# Patient Record
Sex: FEMALE | Race: WHITE | Employment: FULL TIME | URBAN - METROPOLITAN AREA
[De-identification: names, ages, dates, MRNs, and addresses within clinical notes are randomized per-mention and may not be internally consistent; named-entity substitution may affect disease eponyms.]

---

## 2018-09-07 ENCOUNTER — HOSPITAL ENCOUNTER (OUTPATIENT)
Dept: GENERAL RADIOLOGY | Age: 58
Discharge: HOME | End: 2018-09-07
Attending: ORTHOPAEDIC SURGERY
Payer: COMMERCIAL

## 2018-09-07 ENCOUNTER — HOSPITAL ENCOUNTER (OUTPATIENT)
Dept: MRI IMAGING | Age: 58
Discharge: HOME | End: 2018-09-07
Attending: ORTHOPAEDIC SURGERY
Payer: COMMERCIAL

## 2018-09-07 DIAGNOSIS — M25.562 LEFT KNEE PAIN: ICD-10-CM

## 2018-09-07 PROCEDURE — 73721 MRI JNT OF LWR EXTRE W/O DYE: CPT

## 2018-09-07 PROCEDURE — 73501 X-RAY EXAM HIP UNI 1 VIEW: CPT

## 2018-09-07 PROCEDURE — 73560 X-RAY EXAM OF KNEE 1 OR 2: CPT

## 2018-09-07 PROCEDURE — 73600 X-RAY EXAM OF ANKLE: CPT

## 2018-10-25 NOTE — ROUTINE PROCESS
Aarti Marie attended the Joint Replacement Pre-Operative class on  10.25.2018. Topics discussed included surgery preparation, what to expect the day of surgery, medications, nutrition, glycemic control, respiratory therapy, physical and occupational therapy, and discharge planning. A patient education notebook was provided and the opportunity was given to ask questions and phone number of the Orthopaedic  was given for any future questions or concerns. Oscar Browne

## 2018-11-13 ENCOUNTER — HOSPITAL ENCOUNTER (OUTPATIENT)
Dept: PREADMISSION TESTING | Age: 58
Discharge: HOME | End: 2018-11-13
Payer: COMMERCIAL

## 2018-11-13 VITALS — HEIGHT: 71 IN | WEIGHT: 281 LBS | BODY MASS INDEX: 39.34 KG/M2

## 2018-11-13 LAB
ALBUMIN SERPL-MCNC: 3.7 G/DL (ref 3.4–5)
ALBUMIN/GLOB SERPL: 1.2 {RATIO} (ref 0.8–1.7)
ALP SERPL-CCNC: 81 U/L (ref 45–117)
ALT SERPL-CCNC: 37 U/L (ref 13–56)
ANION GAP SERPL CALC-SCNC: 10 MMOL/L (ref 3–18)
APPEARANCE UR: CLEAR
AST SERPL-CCNC: 26 U/L (ref 15–37)
ATRIAL RATE: 80 BPM
BACTERIA SPEC CULT: NORMAL
BACTERIA URNS QL MICRO: ABNORMAL /HPF
BILIRUB SERPL-MCNC: 1 MG/DL (ref 0.2–1)
BILIRUB UR QL: NEGATIVE
BUN SERPL-MCNC: 23 MG/DL (ref 7–18)
BUN/CREAT SERPL: 28
CALCIUM SERPL-MCNC: 8.5 MG/DL (ref 8.5–10.1)
CALCULATED P AXIS, ECG09: 54 DEGREES
CALCULATED R AXIS, ECG10: 52 DEGREES
CALCULATED T AXIS, ECG11: 68 DEGREES
CHLORIDE SERPL-SCNC: 105 MMOL/L (ref 100–108)
CO2 SERPL-SCNC: 25 MMOL/L (ref 21–32)
COLOR UR: YELLOW
CREAT SERPL-MCNC: 0.81 MG/DL (ref 0.6–1.3)
DIAGNOSIS, 93000: NORMAL
EPITH CASTS URNS QL MICRO: ABNORMAL /LPF (ref 0–5)
ERYTHROCYTE [DISTWIDTH] IN BLOOD BY AUTOMATED COUNT: 13.3 % (ref 11.6–14.5)
GLOBULIN SER CALC-MCNC: 3.1 G/DL (ref 2–4)
GLUCOSE SERPL-MCNC: 99 MG/DL (ref 74–99)
GLUCOSE UR STRIP.AUTO-MCNC: NEGATIVE MG/DL
HCT VFR BLD AUTO: 43.1 % (ref 35–45)
HGB BLD-MCNC: 14.1 G/DL (ref 12–16)
HGB UR QL STRIP: NEGATIVE
KETONES UR QL STRIP.AUTO: NEGATIVE MG/DL
LEUKOCYTE ESTERASE UR QL STRIP.AUTO: ABNORMAL
MCH RBC QN AUTO: 29.9 PG (ref 24–34)
MCHC RBC AUTO-ENTMCNC: 32.7 G/DL (ref 31–37)
MCV RBC AUTO: 91.3 FL (ref 74–97)
MUCOUS THREADS URNS QL MICRO: ABNORMAL /LPF
NITRITE UR QL STRIP.AUTO: NEGATIVE
P-R INTERVAL, ECG05: 158 MS
PH UR STRIP: 6 [PH] (ref 5–8)
PLATELET # BLD AUTO: 234 K/UL (ref 135–420)
PMV BLD AUTO: 10.3 FL (ref 9.2–11.8)
POTASSIUM SERPL-SCNC: 4.2 MMOL/L (ref 3.5–5.5)
PROT SERPL-MCNC: 6.8 G/DL (ref 6.4–8.2)
PROT UR STRIP-MCNC: NEGATIVE MG/DL
Q-T INTERVAL, ECG07: 400 MS
QRS DURATION, ECG06: 148 MS
QTC CALCULATION (BEZET), ECG08: 461 MS
RBC # BLD AUTO: 4.72 M/UL (ref 4.2–5.3)
RBC #/AREA URNS HPF: ABNORMAL /HPF (ref 0–5)
SERVICE CMNT-IMP: NORMAL
SODIUM SERPL-SCNC: 140 MMOL/L (ref 136–145)
SP GR UR REFRACTOMETRY: 1.02 (ref 1–1.03)
UROBILINOGEN UR QL STRIP.AUTO: 1 EU/DL (ref 0.2–1)
VENTRICULAR RATE, ECG03: 80 BPM
WBC # BLD AUTO: 4.9 K/UL (ref 4.6–13.2)
WBC URNS QL MICRO: ABNORMAL /HPF (ref 0–5)

## 2018-11-13 PROCEDURE — 81001 URINALYSIS AUTO W/SCOPE: CPT

## 2018-11-13 PROCEDURE — 80053 COMPREHEN METABOLIC PANEL: CPT

## 2018-11-13 PROCEDURE — 85027 COMPLETE CBC AUTOMATED: CPT

## 2018-11-13 PROCEDURE — 87086 URINE CULTURE/COLONY COUNT: CPT

## 2018-11-13 PROCEDURE — 87641 MR-STAPH DNA AMP PROBE: CPT

## 2018-11-13 PROCEDURE — 93005 ELECTROCARDIOGRAM TRACING: CPT

## 2018-11-13 RX ORDER — ERGOCALCIFEROL 1.25 MG/1
50000 CAPSULE ORAL
COMMUNITY

## 2018-11-13 RX ORDER — MELOXICAM 15 MG/1
15 TABLET ORAL DAILY
COMMUNITY
End: 2018-11-28

## 2018-11-13 RX ORDER — DESLORATADINE 5 MG/1
5 TABLET ORAL DAILY
COMMUNITY

## 2018-11-13 RX ORDER — CEFAZOLIN SODIUM/WATER 2 G/20 ML
2 SYRINGE (ML) INTRAVENOUS ONCE
Status: DISCONTINUED | OUTPATIENT
Start: 2018-11-13 | End: 2018-11-13

## 2018-11-13 RX ORDER — LEVOTHYROXINE SODIUM 137 UG/1
137 TABLET ORAL
COMMUNITY

## 2018-11-13 RX ORDER — LOSARTAN POTASSIUM 100 MG/1
100 TABLET ORAL DAILY
COMMUNITY

## 2018-11-13 RX ORDER — SODIUM CHLORIDE, SODIUM LACTATE, POTASSIUM CHLORIDE, CALCIUM CHLORIDE 600; 310; 30; 20 MG/100ML; MG/100ML; MG/100ML; MG/100ML
125 INJECTION, SOLUTION INTRAVENOUS CONTINUOUS
Status: CANCELLED | OUTPATIENT
Start: 2018-11-13

## 2018-11-13 RX ORDER — PANTOPRAZOLE SODIUM 40 MG/1
40 TABLET, DELAYED RELEASE ORAL DAILY
COMMUNITY

## 2018-11-15 LAB
BACTERIA SPEC CULT: NORMAL
SERVICE CMNT-IMP: NORMAL

## 2018-11-26 PROBLEM — I10 HTN (HYPERTENSION): Chronic | Status: ACTIVE | Noted: 2018-11-26

## 2018-11-26 PROBLEM — E03.9 ACQUIRED HYPOTHYROIDISM: Chronic | Status: ACTIVE | Noted: 2018-11-26

## 2018-11-26 PROBLEM — M17.12 PRIMARY OSTEOARTHRITIS OF LEFT KNEE: Chronic | Status: ACTIVE | Noted: 2018-11-26

## 2018-11-26 NOTE — H&P
History and Physical 
 
 
 
Patient: Lieutenant Kapoor               Sex: female          DOA: (Not on file) YOB: 1960      Age:  62 y.o.        LOS:  LOS: 0 days HPI:  
 
Lucien Cutler is a 35-year-old right-handed white female referred here today for evaluation and treatment of her left knee severe medial tibiofemoral/moderate patellofemoral DJD. The patient has had progressive knee pain over the years and it has not gotten better with relative rest.  She has already been through Mobic, over-the-counter NSAIDs, cortisone injections, viscosupplementation, and physical therapy; all of which have not made her any better. She has had the progressive pain over 10 years and is now ready to progress to knee replacement. Her primary care is Dr. Rachna Claudio. Standing AP, tunnel, lateral, and sunrise views of the left knee were obtained and interpreted in the office and reveal bone-on-bone medial tibiofemoral and moderate patellofemoral DJD. Otherwise, no periosteal reaction, no medullary lesions, no osteopenia, and no chondrolysis. Past Medical History:  
Diagnosis Date  Arthritis   
 all over  Autoimmune disease ([address] Lichen Planus disease on gums  GERD (gastroesophageal reflux disease) 2016  
 well controlled  Hypertension 2016  Thyroid disease many years Past Surgical History:  
Procedure Laterality Date  HX  SECTION    
 x 2  
 HX HEENT  1998  
 thyroid removed  HX OTHER SURGICAL  as a child  
 mole removed from ankle  HX OTHER SURGICAL    
 fatty tumor removed from back No family history on file. Social History Socioeconomic History  Marital status:  Spouse name: Not on file  Number of children: Not on file  Years of education: Not on file  Highest education level: Not on file Tobacco Use  Smoking status: Never Smoker  Smokeless tobacco: Never Used Substance and Sexual Activity  Alcohol use: Yes Alcohol/week: 2.4 oz Types: 4 Glasses of wine per week Comment: advised no etoh 24 h pre  Drug use: No  
 
 
Prior to Admission medications Medication Sig Start Date End Date Taking? Authorizing Provider  
losartan (COZAAR) 100 mg tablet Take 100 mg by mouth daily. Provider, Historical  
desloratadine (CLARINEX) 5 mg tablet Take 5 mg by mouth daily. Provider, Historical  
levothyroxine (SYNTHROID) 137 mcg tablet Take 137 mcg by mouth Daily (before breakfast). Provider, Historical  
pantoprazole (PROTONIX) 40 mg tablet Take 40 mg by mouth daily. Provider, Historical  
meloxicam (MOBIC) 15 mg tablet Take 15 mg by mouth daily. Provider, Historical  
ergocalciferol (VITAMIN D2) 50,000 unit capsule Take 50,000 Units by mouth two (2) days a week. Provider, Historical  
 
 
Not on File Review of Systems Pertinent positives include joint pain, joint stiffness, numbness/tingling and swelling of feet. Pertinent negatives include blurred vision, chest pain, chills, cold, discharge of the eyes, dizziness, double vision, fever, headache, hearing loss, heart murmur, itching of the eyes, palpitations, redness of the eyes, rheumatic fever, ringing in ears, sore throat/hoarseness, weight change, abdominal pain, anxiety, bipolar disorder, bladder/kidney infection, bloody stool, blood in urine, burning sensation, changes in mood, chronic cough, depression, diarrhea, difficulty breathing, difficulty swallowing, fainting, frequent urinating, fracture/dislocation, gas/bloating, gout, hemorrhoids, incontinence, loss of balance, memory loss, muscle weakness, nausea/vomiting, pain on breathing, painful urination, psoriasis, rash/itching, Raynaud's phenomenon, rheumatoid disease, seizure disorder, shortness of breath, sprain/strain, tendonitis, varicose veins and wheezing. Physical Exam:  
  
There were no vitals taken for this visit. Physical Exam: Physical exam shows a healthy-appearing 80-year-old  female. The left knee has full extension to flexion to about 120 degrees. She has a negative Lachman and pain on the medial joint line, more so than the lateral joint line and positive patellofemoral crepitation with positive patellar inhibition. Otherwise, examination of the knee demonstrates the patient has no varus or valgus instability at zero degrees or 30 degrees. There is a negative posterior sag. There is no knee effusion. There is no swelling, ecchymosis, or wounds. The patient has no lateral joint line pain and no popliteal pain. The patient has a negative patellar grind and a negative patellar apprehension test.  There is a negative Kali Maneuver. There is no pain at the inferior pole of the patella or the tibial tubercle. The patient has 5/5 muscle strength with good quadriceps tone. The patient has good capillary refill with normal motor strength of the foot and ankle and normal light-touch sensation in the foot and lower leg. Assessment/Plan Principal Problem: 
  Primary osteoarthritis of left knee (11/26/2018) Active Problems: 
  HTN (hypertension) (11/26/2018) Acquired hypothyroidism (11/26/2018) Dr. Timmy Flores thinks she is a good candidate for the Journey-2 XR with Visionaire blocks. The patient understands the risks, the alternatives, and the benefits including infection, pain, bleeding, and DVT. She is willing to proceed. We will plan to do this on an outpatient basis. Dr. Timmy Flores issued Roxicodone and Keflex to her pharmacy today, and she will take baby aspirin for postoperative DVT prophylaxis. We will arrange her on my outpatient physical therapy protocol. Dr. Timmy Flores will see her again two weeks postop. We are going to do this in November of 2018.

## 2018-11-27 RX ORDER — SODIUM CHLORIDE, SODIUM LACTATE, POTASSIUM CHLORIDE, CALCIUM CHLORIDE 600; 310; 30; 20 MG/100ML; MG/100ML; MG/100ML; MG/100ML
125 INJECTION, SOLUTION INTRAVENOUS CONTINUOUS
Status: CANCELLED | OUTPATIENT
Start: 2018-11-27 | End: 2018-11-28

## 2018-11-27 RX ORDER — DIPHENHYDRAMINE HCL 25 MG
25 CAPSULE ORAL
Status: CANCELLED | OUTPATIENT
Start: 2018-11-27

## 2018-11-27 RX ORDER — SODIUM CHLORIDE 0.9 % (FLUSH) 0.9 %
5-10 SYRINGE (ML) INJECTION EVERY 8 HOURS
Status: CANCELLED | OUTPATIENT
Start: 2018-11-27

## 2018-11-27 RX ORDER — SODIUM CHLORIDE 0.9 % (FLUSH) 0.9 %
5-10 SYRINGE (ML) INJECTION AS NEEDED
Status: CANCELLED | OUTPATIENT
Start: 2018-11-27

## 2018-11-28 ENCOUNTER — ANESTHESIA (OUTPATIENT)
Dept: SURGERY | Age: 58
End: 2018-11-28
Payer: COMMERCIAL

## 2018-11-28 ENCOUNTER — ANESTHESIA EVENT (OUTPATIENT)
Dept: SURGERY | Age: 58
End: 2018-11-28
Payer: COMMERCIAL

## 2018-11-28 ENCOUNTER — HOSPITAL ENCOUNTER (OUTPATIENT)
Age: 58
Discharge: HOME | End: 2018-11-28
Attending: ORTHOPAEDIC SURGERY | Admitting: ORTHOPAEDIC SURGERY
Payer: COMMERCIAL

## 2018-11-28 VITALS
HEIGHT: 71 IN | SYSTOLIC BLOOD PRESSURE: 126 MMHG | WEIGHT: 278.6 LBS | TEMPERATURE: 98.1 F | DIASTOLIC BLOOD PRESSURE: 87 MMHG | BODY MASS INDEX: 39 KG/M2 | OXYGEN SATURATION: 91 % | RESPIRATION RATE: 18 BRPM | HEART RATE: 94 BPM

## 2018-11-28 DIAGNOSIS — M17.12 PRIMARY OSTEOARTHRITIS OF LEFT KNEE: Primary | Chronic | ICD-10-CM

## 2018-11-28 LAB
ABO + RH BLD: NORMAL
BLOOD GROUP ANTIBODIES SERPL: NORMAL
SPECIMEN EXP DATE BLD: NORMAL

## 2018-11-28 PROCEDURE — 77030016060 HC NDL NRV BLK TELE -A: Performed by: ANESTHESIOLOGY

## 2018-11-28 PROCEDURE — 77030018836 HC SOL IRR NACL ICUM -A: Performed by: ORTHOPAEDIC SURGERY

## 2018-11-28 PROCEDURE — 77030002934 HC SUT MCRYL J&J -B: Performed by: ORTHOPAEDIC SURGERY

## 2018-11-28 PROCEDURE — 36415 COLL VENOUS BLD VENIPUNCTURE: CPT

## 2018-11-28 PROCEDURE — 77030012508 HC MSK AIRWY LMA AMBU -A: Performed by: ANESTHESIOLOGY

## 2018-11-28 PROCEDURE — 74011250636 HC RX REV CODE- 250/636

## 2018-11-28 PROCEDURE — 74011250636 HC RX REV CODE- 250/636: Performed by: PHYSICIAN ASSISTANT

## 2018-11-28 PROCEDURE — 74011250636 HC RX REV CODE- 250/636: Performed by: ORTHOPAEDIC SURGERY

## 2018-11-28 PROCEDURE — 86901 BLOOD TYPING SEROLOGIC RH(D): CPT

## 2018-11-28 PROCEDURE — 77030032490 HC SLV COMPR SCD KNE COVD -B: Performed by: ORTHOPAEDIC SURGERY

## 2018-11-28 PROCEDURE — C1713 ANCHOR/SCREW BN/BN,TIS/BN: HCPCS | Performed by: ORTHOPAEDIC SURGERY

## 2018-11-28 PROCEDURE — 76060000035 HC ANESTHESIA 2 TO 2.5 HR: Performed by: ORTHOPAEDIC SURGERY

## 2018-11-28 PROCEDURE — 64447 NJX AA&/STRD FEMORAL NRV IMG: CPT | Performed by: ORTHOPAEDIC SURGERY

## 2018-11-28 PROCEDURE — 77030037875 HC DRSG MEPILEX <16IN BORD MOLN -A: Performed by: ORTHOPAEDIC SURGERY

## 2018-11-28 PROCEDURE — 77030031139 HC SUT VCRL2 J&J -A: Performed by: ORTHOPAEDIC SURGERY

## 2018-11-28 PROCEDURE — 76942 ECHO GUIDE FOR BIOPSY: CPT | Performed by: ORTHOPAEDIC SURGERY

## 2018-11-28 PROCEDURE — 74011250637 HC RX REV CODE- 250/637: Performed by: ANESTHESIOLOGY

## 2018-11-28 PROCEDURE — 74011000258 HC RX REV CODE- 258: Performed by: ORTHOPAEDIC SURGERY

## 2018-11-28 PROCEDURE — 77030003666 HC NDL SPINAL BD -A: Performed by: ORTHOPAEDIC SURGERY

## 2018-11-28 PROCEDURE — L1830 KO IMMOB CANVAS LONG PRE OTS: HCPCS | Performed by: ORTHOPAEDIC SURGERY

## 2018-11-28 PROCEDURE — 74011250636 HC RX REV CODE- 250/636: Performed by: ANESTHESIOLOGY

## 2018-11-28 PROCEDURE — 77030038010: Performed by: ORTHOPAEDIC SURGERY

## 2018-11-28 PROCEDURE — 74011250637 HC RX REV CODE- 250/637: Performed by: PHYSICIAN ASSISTANT

## 2018-11-28 PROCEDURE — 74011000250 HC RX REV CODE- 250

## 2018-11-28 PROCEDURE — 76010000131 HC OR TIME 2 TO 2.5 HR: Performed by: ORTHOPAEDIC SURGERY

## 2018-11-28 PROCEDURE — 97116 GAIT TRAINING THERAPY: CPT

## 2018-11-28 PROCEDURE — 77030020268 HC MISC GENERAL SUPPLY: Performed by: ORTHOPAEDIC SURGERY

## 2018-11-28 PROCEDURE — 77030039267 HC ADH SKN EXOFIN S2SG -B: Performed by: ORTHOPAEDIC SURGERY

## 2018-11-28 PROCEDURE — 74011000250 HC RX REV CODE- 250: Performed by: ORTHOPAEDIC SURGERY

## 2018-11-28 PROCEDURE — 77030020782 HC GWN BAIR PAWS FLX 3M -B: Performed by: ORTHOPAEDIC SURGERY

## 2018-11-28 PROCEDURE — 64447 NJX AA&/STRD FEMORAL NRV IMG: CPT | Performed by: ANESTHESIOLOGY

## 2018-11-28 PROCEDURE — 76210000022 HC REC RM PH II 1.5 TO 2 HR: Performed by: ORTHOPAEDIC SURGERY

## 2018-11-28 PROCEDURE — 77030013708 HC HNDPC SUC IRR PULS STRY –B: Performed by: ORTHOPAEDIC SURGERY

## 2018-11-28 PROCEDURE — 77030027138 HC INCENT SPIROMETER -A: Performed by: ORTHOPAEDIC SURGERY

## 2018-11-28 PROCEDURE — C1776 JOINT DEVICE (IMPLANTABLE): HCPCS | Performed by: ORTHOPAEDIC SURGERY

## 2018-11-28 PROCEDURE — 76210000016 HC OR PH I REC 1 TO 1.5 HR: Performed by: ORTHOPAEDIC SURGERY

## 2018-11-28 PROCEDURE — 77030034694 HC SCPL CANADY PLSM DISP USMD -E: Performed by: ORTHOPAEDIC SURGERY

## 2018-11-28 PROCEDURE — 97161 PT EVAL LOW COMPLEX 20 MIN: CPT

## 2018-11-28 DEVICE — CEMENT BNE 40GM FULL DOSE PMMA W/O ANTIBIO HI VISC N RADPQ: Type: IMPLANTABLE DEVICE | Site: KNEE | Status: FUNCTIONAL

## 2018-11-28 RX ORDER — FENTANYL CITRATE 50 UG/ML
25 INJECTION, SOLUTION INTRAMUSCULAR; INTRAVENOUS AS NEEDED
Status: DISCONTINUED | OUTPATIENT
Start: 2018-11-28 | End: 2018-11-28 | Stop reason: HOSPADM

## 2018-11-28 RX ORDER — MIDAZOLAM HYDROCHLORIDE 1 MG/ML
INJECTION, SOLUTION INTRAMUSCULAR; INTRAVENOUS AS NEEDED
Status: DISCONTINUED | OUTPATIENT
Start: 2018-11-28 | End: 2018-11-28 | Stop reason: HOSPADM

## 2018-11-28 RX ORDER — ROPIVACAINE HYDROCHLORIDE 5 MG/ML
INJECTION, SOLUTION EPIDURAL; INFILTRATION; PERINEURAL
Status: COMPLETED | OUTPATIENT
Start: 2018-11-28 | End: 2018-11-28

## 2018-11-28 RX ORDER — MIDAZOLAM HYDROCHLORIDE 1 MG/ML
INJECTION, SOLUTION INTRAMUSCULAR; INTRAVENOUS
Status: COMPLETED | OUTPATIENT
Start: 2018-11-28 | End: 2018-11-28

## 2018-11-28 RX ORDER — ALBUTEROL SULFATE 0.83 MG/ML
2.5 SOLUTION RESPIRATORY (INHALATION) AS NEEDED
Status: DISCONTINUED | OUTPATIENT
Start: 2018-11-28 | End: 2018-11-28 | Stop reason: HOSPADM

## 2018-11-28 RX ORDER — ONDANSETRON 2 MG/ML
4 INJECTION INTRAMUSCULAR; INTRAVENOUS
Status: COMPLETED | OUTPATIENT
Start: 2018-11-28 | End: 2018-11-28

## 2018-11-28 RX ORDER — ACETAMINOPHEN 500 MG
1000 TABLET ORAL ONCE
Status: COMPLETED | OUTPATIENT
Start: 2018-11-28 | End: 2018-11-28

## 2018-11-28 RX ORDER — SODIUM CHLORIDE, SODIUM LACTATE, POTASSIUM CHLORIDE, CALCIUM CHLORIDE 600; 310; 30; 20 MG/100ML; MG/100ML; MG/100ML; MG/100ML
150 INJECTION, SOLUTION INTRAVENOUS CONTINUOUS
Status: DISCONTINUED | OUTPATIENT
Start: 2018-11-28 | End: 2018-11-28 | Stop reason: HOSPADM

## 2018-11-28 RX ORDER — PREGABALIN 100 MG/1
100 CAPSULE ORAL
Status: COMPLETED | OUTPATIENT
Start: 2018-11-28 | End: 2018-11-28

## 2018-11-28 RX ORDER — DIPHENHYDRAMINE HYDROCHLORIDE 50 MG/ML
12.5 INJECTION, SOLUTION INTRAMUSCULAR; INTRAVENOUS
Status: DISCONTINUED | OUTPATIENT
Start: 2018-11-28 | End: 2018-11-28 | Stop reason: HOSPADM

## 2018-11-28 RX ORDER — LIDOCAINE HYDROCHLORIDE 20 MG/ML
INJECTION, SOLUTION EPIDURAL; INFILTRATION; INTRACAUDAL; PERINEURAL AS NEEDED
Status: DISCONTINUED | OUTPATIENT
Start: 2018-11-28 | End: 2018-11-28 | Stop reason: HOSPADM

## 2018-11-28 RX ORDER — GUAIFENESIN 100 MG/5ML
81 LIQUID (ML) ORAL 2 TIMES DAILY
Qty: 42 TAB | Refills: 0 | Status: SHIPPED
Start: 2018-11-28

## 2018-11-28 RX ORDER — NALOXONE HYDROCHLORIDE 0.4 MG/ML
0.1 INJECTION, SOLUTION INTRAMUSCULAR; INTRAVENOUS; SUBCUTANEOUS AS NEEDED
Status: DISCONTINUED | OUTPATIENT
Start: 2018-11-28 | End: 2018-11-28 | Stop reason: HOSPADM

## 2018-11-28 RX ORDER — TRANEXAMIC ACID 650 1/1
1950 TABLET ORAL ONCE
Status: COMPLETED | OUTPATIENT
Start: 2018-11-28 | End: 2018-11-28

## 2018-11-28 RX ORDER — DEXTROSE 50 % IN WATER (D50W) INTRAVENOUS SYRINGE
25-50 AS NEEDED
Status: DISCONTINUED | OUTPATIENT
Start: 2018-11-28 | End: 2018-11-28 | Stop reason: HOSPADM

## 2018-11-28 RX ORDER — SODIUM CHLORIDE 0.9 % (FLUSH) 0.9 %
5-10 SYRINGE (ML) INJECTION AS NEEDED
Status: DISCONTINUED | OUTPATIENT
Start: 2018-11-28 | End: 2018-11-28 | Stop reason: HOSPADM

## 2018-11-28 RX ORDER — MAGNESIUM SULFATE 100 %
4 CRYSTALS MISCELLANEOUS AS NEEDED
Status: DISCONTINUED | OUTPATIENT
Start: 2018-11-28 | End: 2018-11-28 | Stop reason: HOSPADM

## 2018-11-28 RX ORDER — ONDANSETRON 2 MG/ML
4 INJECTION INTRAMUSCULAR; INTRAVENOUS ONCE
Status: DISCONTINUED | OUTPATIENT
Start: 2018-11-28 | End: 2018-11-28 | Stop reason: HOSPADM

## 2018-11-28 RX ORDER — SODIUM CHLORIDE 0.9 % (FLUSH) 0.9 %
5-10 SYRINGE (ML) INJECTION EVERY 8 HOURS
Status: DISCONTINUED | OUTPATIENT
Start: 2018-11-28 | End: 2018-11-28 | Stop reason: HOSPADM

## 2018-11-28 RX ORDER — PROPOFOL 10 MG/ML
INJECTION, EMULSION INTRAVENOUS AS NEEDED
Status: DISCONTINUED | OUTPATIENT
Start: 2018-11-28 | End: 2018-11-28 | Stop reason: HOSPADM

## 2018-11-28 RX ORDER — DEXAMETHASONE SODIUM PHOSPHATE 4 MG/ML
8 INJECTION, SOLUTION INTRA-ARTICULAR; INTRALESIONAL; INTRAMUSCULAR; INTRAVENOUS; SOFT TISSUE ONCE
Status: COMPLETED | OUTPATIENT
Start: 2018-11-28 | End: 2018-11-28

## 2018-11-28 RX ORDER — OXYCODONE HYDROCHLORIDE 5 MG/1
5 TABLET ORAL ONCE
Status: COMPLETED | OUTPATIENT
Start: 2018-11-28 | End: 2018-11-28

## 2018-11-28 RX ORDER — CEPHALEXIN 500 MG/1
500 CAPSULE ORAL 4 TIMES DAILY
Qty: 4 CAP | Refills: 0 | Status: SHIPPED
Start: 2018-11-28

## 2018-11-28 RX ORDER — CELECOXIB 100 MG/1
200 CAPSULE ORAL
Status: COMPLETED | OUTPATIENT
Start: 2018-11-28 | End: 2018-11-28

## 2018-11-28 RX ORDER — GLYCOPYRROLATE 0.2 MG/ML
INJECTION INTRAMUSCULAR; INTRAVENOUS AS NEEDED
Status: DISCONTINUED | OUTPATIENT
Start: 2018-11-28 | End: 2018-11-28 | Stop reason: HOSPADM

## 2018-11-28 RX ORDER — OXYCODONE HYDROCHLORIDE 5 MG/1
5-10 TABLET ORAL
Qty: 60 TAB | Refills: 0 | Status: SHIPPED
Start: 2018-11-28

## 2018-11-28 RX ORDER — ONDANSETRON 2 MG/ML
INJECTION INTRAMUSCULAR; INTRAVENOUS AS NEEDED
Status: DISCONTINUED | OUTPATIENT
Start: 2018-11-28 | End: 2018-11-28 | Stop reason: HOSPADM

## 2018-11-28 RX ORDER — KETAMINE HYDROCHLORIDE 10 MG/ML
INJECTION, SOLUTION INTRAMUSCULAR; INTRAVENOUS AS NEEDED
Status: DISCONTINUED | OUTPATIENT
Start: 2018-11-28 | End: 2018-11-28 | Stop reason: HOSPADM

## 2018-11-28 RX ORDER — HYDROMORPHONE HYDROCHLORIDE 2 MG/ML
INJECTION, SOLUTION INTRAMUSCULAR; INTRAVENOUS; SUBCUTANEOUS AS NEEDED
Status: DISCONTINUED | OUTPATIENT
Start: 2018-11-28 | End: 2018-11-28 | Stop reason: HOSPADM

## 2018-11-28 RX ORDER — SODIUM CHLORIDE, SODIUM LACTATE, POTASSIUM CHLORIDE, CALCIUM CHLORIDE 600; 310; 30; 20 MG/100ML; MG/100ML; MG/100ML; MG/100ML
125 INJECTION, SOLUTION INTRAVENOUS CONTINUOUS
Status: DISCONTINUED | OUTPATIENT
Start: 2018-11-28 | End: 2018-11-28 | Stop reason: HOSPADM

## 2018-11-28 RX ADMIN — ONDANSETRON 4 MG: 2 INJECTION INTRAMUSCULAR; INTRAVENOUS at 09:17

## 2018-11-28 RX ADMIN — SODIUM CHLORIDE, SODIUM LACTATE, POTASSIUM CHLORIDE, AND CALCIUM CHLORIDE: 600; 310; 30; 20 INJECTION, SOLUTION INTRAVENOUS at 09:58

## 2018-11-28 RX ADMIN — ACETAMINOPHEN 1000 MG: 500 TABLET ORAL at 07:29

## 2018-11-28 RX ADMIN — CEFAZOLIN SODIUM 3 G: 10 INJECTION, POWDER, FOR SOLUTION INTRAVENOUS at 09:15

## 2018-11-28 RX ADMIN — MIDAZOLAM HYDROCHLORIDE 2 MG: 1 INJECTION, SOLUTION INTRAMUSCULAR; INTRAVENOUS at 09:05

## 2018-11-28 RX ADMIN — LIDOCAINE HYDROCHLORIDE 100 MG: 20 INJECTION, SOLUTION EPIDURAL; INFILTRATION; INTRACAUDAL; PERINEURAL at 09:11

## 2018-11-28 RX ADMIN — GLYCOPYRROLATE 0.2 MG: 0.2 INJECTION INTRAMUSCULAR; INTRAVENOUS at 09:05

## 2018-11-28 RX ADMIN — KETAMINE HYDROCHLORIDE 40 MG: 10 INJECTION, SOLUTION INTRAMUSCULAR; INTRAVENOUS at 09:10

## 2018-11-28 RX ADMIN — PREGABALIN 100 MG: 100 CAPSULE ORAL at 07:29

## 2018-11-28 RX ADMIN — KETAMINE HYDROCHLORIDE 10 MG: 10 INJECTION, SOLUTION INTRAMUSCULAR; INTRAVENOUS at 09:44

## 2018-11-28 RX ADMIN — SODIUM CHLORIDE, SODIUM LACTATE, POTASSIUM CHLORIDE, AND CALCIUM CHLORIDE 150 ML/HR: 600; 310; 30; 20 INJECTION, SOLUTION INTRAVENOUS at 11:58

## 2018-11-28 RX ADMIN — OXYCODONE HYDROCHLORIDE 5 MG: 5 TABLET ORAL at 12:05

## 2018-11-28 RX ADMIN — ONDANSETRON 4 MG: 2 INJECTION INTRAMUSCULAR; INTRAVENOUS at 13:28

## 2018-11-28 RX ADMIN — CELECOXIB 200 MG: 100 CAPSULE ORAL at 07:29

## 2018-11-28 RX ADMIN — HYDROMORPHONE HYDROCHLORIDE 1 MG: 2 INJECTION, SOLUTION INTRAMUSCULAR; INTRAVENOUS; SUBCUTANEOUS at 09:20

## 2018-11-28 RX ADMIN — TRANEXAMIC ACID 1950 MG: 650 TABLET ORAL at 07:16

## 2018-11-28 RX ADMIN — ROPIVACAINE HYDROCHLORIDE 20 ML: 5 INJECTION, SOLUTION EPIDURAL; INFILTRATION; PERINEURAL at 07:55

## 2018-11-28 RX ADMIN — MIDAZOLAM HYDROCHLORIDE 2 MG: 1 INJECTION, SOLUTION INTRAMUSCULAR; INTRAVENOUS at 07:55

## 2018-11-28 RX ADMIN — DEXAMETHASONE SODIUM PHOSPHATE 8 MG: 4 INJECTION, SOLUTION INTRAMUSCULAR; INTRAVENOUS at 07:35

## 2018-11-28 RX ADMIN — SODIUM CHLORIDE, SODIUM LACTATE, POTASSIUM CHLORIDE, AND CALCIUM CHLORIDE 125 ML/HR: 600; 310; 30; 20 INJECTION, SOLUTION INTRAVENOUS at 07:13

## 2018-11-28 RX ADMIN — PROPOFOL 200 MG: 10 INJECTION, EMULSION INTRAVENOUS at 09:11

## 2018-11-28 RX ADMIN — SODIUM CHLORIDE, SODIUM LACTATE, POTASSIUM CHLORIDE, AND CALCIUM CHLORIDE 1000 ML: 600; 310; 30; 20 INJECTION, SOLUTION INTRAVENOUS at 07:13

## 2018-11-28 RX ADMIN — FENTANYL CITRATE 25 MCG: 50 INJECTION, SOLUTION INTRAMUSCULAR; INTRAVENOUS at 11:59

## 2018-11-28 NOTE — ANESTHESIA PROCEDURE NOTES
Peripheral Block Start time: 11/28/2018 7:50 AM 
End time: 11/28/2018 7:55 AM 
Performed by: Curly Melvin MD 
Authorized by: Curly Melvin MD  
 
 
Pre-procedure: Indications: at surgeon's request, post-op pain management and procedure for pain Preanesthetic Checklist: patient identified, risks and benefits discussed, site marked, timeout performed, anesthesia consent given and patient being monitored Block Type:  
Block Type: Adductor canal 
Laterality:  Left Monitoring:  Standard ASA monitoring, continuous pulse ox, frequent vital sign checks, heart rate, oxygen and responsive to questions Injection Technique:  Single shot Procedures: ultrasound guided Patient Position: supine Prep: chlorhexidine Location:  Mid thigh Needle Type:  Stimuplex Needle Gauge:  20 G Needle Localization:  Anatomical landmarks and ultrasound guidance Assessment: 
Number of attempts:  1 Injection Assessment:  Incremental injection every 5 mL, local visualized surrounding nerve on ultrasound, negative aspiration for blood, negative aspiration for CSF, no paresthesia, no intravascular symptoms, low pressure verified by pressure monitor and ultrasound image on chart Patient tolerance:  Patient tolerated the procedure well with no immediate complications

## 2018-11-28 NOTE — OP NOTES
Left Tourniquetless BiCruciate Retaining Total Knee Arthroplasty    Patient: Claudette Chapin MRN: [id number]  CSN: [id number]   YOB: 1960  Age: 62 y.o. Sex: female        PREOPERATIVE DIAGNOSIS:  Left severe tibiofemoral degenerative joint disease. POSTOPERATIVE DIAGNOSIS:  Left severe tibiofemoral degenerative joint disease. PROCEDURE PERFORMED:  Left bicruciate retaining cemented total knee arthroplasty using the [employer] Journey II XR with Visionaire blocks. IMPLANTS:   Implant Name Type Inv. Item Serial No.  Lot No. LRB No. Used Action   CEMENT BNE SMARTSET HV 40GM -- ORDER IN SETS OF 20 - [id number]  CEMENT BNE SMARTSET HV 40GM -- ORDER IN SETS OF 20  [id number] Left 1 Implanted   Patellar     [id number] Left 1 Implanted   Lateral insert     [id number] Left 1 Implanted   Medial insert     [id number] Left 1 Implanted   Femoral component     [id number] Left 1 Implanted   Tibial baseplate     [id number] Left 1 Implanted        SURGEON:  Sasha Sauer MD    FIRST ASSISTANT:  Starr Benitez PA-C    ANESTHESIA:  General    COMPLICATIONS:  None. TOURNIQUET TIME:  None. SPECIMEN TO PATHOLOGY:  * No specimens in log *    ESTIMATED BLOOD LOSS:  75cc      INDICATIONS:  This is a 62y.o. year old female  with known severe tibiofemoral DJD that is unresponsive to all conservative intervention. The patient has bone-on-bone arthritis by x-rays and has failed all conservative interventions including medications, weight loss, physical therapy, relative rest, ambulatory aids and injections. The patient now presents for a bicruciate retaining total knee arthroplasty due to constant pain with activities of daily living and diminished safety due to patients pain. OPERATION:  The patient was brought to the operating theater and after adequate anesthesia, the surgical knee was prepped and draped in the typical sterile fashion.   The 1.95gm of po tranexamic acid was already administered 2 hours before surgery. An anterior midline incision was then made from just above the patella to the tibial tubercle. The incision was taken down and medial and lateral flaps were developed using the argon wand. The analgesic cocktail used for the case was 50cc of .25% Marcaine with epinephrine mixed with 30 mg( 1cc ) of Toradol and 30cc of NS ( total of 81 cc). 40cc's was injected in the skin and capsule/quadriceps after the incision. A mid vastus approach to the knee was then performed. This was injected in the skin and in the capsule and around the VMO. The dissection was carried on the proximal medial tibia to about the mid lateral line and I undermined the patella to its insertion to get better visualization. The patella was everted. It was measured and it was cut from the original thickness of 25 to the residual thickness of about 15 mm. It was cut by free hand technique and it was cut from the medial articular edge to the lateral articular edge. The appropriate sized oblong patella guide was then placed on the cut surface of the patella with good fit and the 3 peg holes were drilled. The patella was then laterally electrocauterized and any excess patella was resected. .  The patella was then slid laterally and the knee flexed greater than 90 degrees. I used the patient matched Visionaire guide on the femur first.  It was then pinned with 4 pins, 2 distally and 2 proximally. This had excellent fit. The distal resection was then made and extension gap was checked with the femoral gauge which showed the appropriate laxity medial and lateral.  The predetermined size five in 1 cutting block was then placed in position and the drill pins were used to hold it locked in place. I made all 5 distal femoral cuts at this time, making sure to keep the ACL out of harm's way.   The guide was then removed and then I placed the tibial patient matched Visionaire guide on the proximal tibia and it lined up well with the proposed cuts. I then placed the 2 pins for the datum block. I checked the alignment with the drop nicol and it was right to the first webspace. Another pin was placed in line with the medial aspect of the sagittal cut. The guide was then removed and the datum block was placed over the 2 pins that were left in place. The goalpost guide was then placed in position on either side of the ACL and the medial and lateral sagittal resections were then performed protecting my cut with the 2 drill pins that were already placed from anterior to posterior being sure they did not penetrate beyond the bone posteriorly. The medial tibial plateau resection was then performed at this level and then this medial piece of bone was removed. The remaining menisci was removed at this time and the femur was placed on the distal femur and this was trialled with the correct poly which gave appropriate slight laxity. At this point, attention was turned to the lateral joint. I placed the lateral saw capture cutting block into the drill hole towards the lateral  sagittal section that was performed. The patella was kept out of harm's way and a resection was performed with a sagittal saw from proximal to distal.  This piece of the femur was then removed. We placed the trial for the medial and lateral guides with the femur in position. At this point, it showed good stability, excellent range of motion and normal 1-2 mm of laxity of the medial and lateral joints. She had zero to 120 degrees of flexion. The anterior chamfer on the lip of the femur was then performed and the 2 lug holes were drilled.   The datum block was then placed back on the drill pins and the guide for the eminence box chisel for the anterior ACL cut ( just anterior to ACL insertion) was placed flush anteriorly and then this was used to transect to the appropriate depth and then the saw was used through the medial portion of the datum block to resect the small bony piece anteriorly. The guide system was then removed and the appropriate sized tibial guide was placed on top of the tibia which had excellent coverage. With this in position, it had 2 drill pins placed to hold it in place. I then put the tibial punch guide through the top of the 4 left tibial guide, which allowed 4 large drill pins to be placed and the punch was used to finish the tibia. These were all removed and the small curette on the field was used to remove any remaining bone in the keel. Once this was all done, the posterior part of   the knee was irrigated out. It was cauterized with the argon and about 20 mL of the Marcaine mixture was placed in the posterior part of the knee about 10 mL on both sides. The menisci had already been removed. The [employer] HV cement was mixed on the back table and the components were placed on the table. This had already been Simpulse lavaged. The tibia was cemented first and the cement was placed into the keel and also onto the backside of the tibial component. This was then impacted into the femur with good purchase. All excessive cement was removed with pickups and a knife. The polyethylenes were then snapped into position medially and laterally. The femur was then inserted in position and seated fully. All excessive cement was removed with pickups and a 15 blade. The knee was then extended fully and anterior femoral pressure applied. The knee was flexed again and any excess cement was removed again at this time. The patella was addressed last where the cement was placed on the patella. It was put in position and held in by the clamp until the cemented had compressed fully. All excess cement was removed with pickups and a 15 blade. The clamp was removed. Range of motion was assessed and was zero to 120 of knee flexion with great stability and good motion.   The wound was then irrigated out again and the arthrotomy was closed with a running #1 Stratafix. The skin was closed with inverted 2-0 Vicryls and then the skin was sealed with Dermabond Prineo skin system. Any additional Marcaine was placed intra-articular at this time. This patient then was dressed with cast padding and Ace wrap from the toes to mid thigh, placed in a knee immobilizer and returned to recovery room awake and in stable condition. All instrument, sponge and needle counts were correct.       Abiola Street MD  11/28/2018

## 2018-11-28 NOTE — ANESTHESIA POSTPROCEDURE EVALUATION
Procedure(s): LEFT TOTAL KNEE ARTHROPLASTY. Anesthesia Post Evaluation Multimodal analgesia: multimodal analgesia not used between 6 hours prior to anesthesia start to PACU discharge Patient location during evaluation: PACU Patient participation: complete - patient participated Level of consciousness: awake Pain management: adequate Airway patency: patent Anesthetic complications: no 
Cardiovascular status: acceptable Respiratory status: acceptable Hydration status: acceptable Post anesthesia nausea and vomiting:  none Visit Vitals /80 Pulse (!) 103 Temp 36.8 °C (98.2 °F) Resp 24 Ht 5' 11\" (1.803 m) Wt 126.4 kg (278 lb 9.6 oz) SpO2 92% BMI 38.86 kg/m²

## 2018-11-28 NOTE — INTERVAL H&P NOTE
H&P Update: 
Victor Hugo Khan was seen and examined. History and physical has been reviewed. The patient has been examined. There have been no significant clinical changes since the completion of the originally dated History and Physical. 
Patient identified by surgeon; surgical site was confirmed by patient and surgeon.  
 
Signed By: Laurian Brittle, MD   
 November 28, 2018 7:50 AM

## 2018-11-28 NOTE — DISCHARGE INSTRUCTIONS
Andriy Rouse III, MD Roxana Lank, PA-C    Lower Extremity Surgery  Discharge Instructions      Please take the time to review the following instructions before you leave the hospital and use them as guidelines during your recovery from surgery. If you have any questions you may contact my office at [phone]. Wound Care/Dressing Changes:    [x]   You may change your dressing as needed. Beginning the 2 days after you are discharged from the hospital you can change your dressing daily. A big, bulky dressing isn't necessary as long as there isn't any drainage from the incisions. You will be shown how to change the dressings properly by the home health aids as well. There will be a piece of tape over your incision that resembles gauze. This tape should stay on and you should not attempt to remove it. Once you begin to get this wet in the shower this will begin to fall off on its own, although it will take days. Do not apply antibiotic ointment to your incisions. Showering/Bathing:    [x]   You may shower 2 days after surgery. Your dressing may be removed for showering. You may get your incisions wet in the shower. Don't vigorously scrub the area where your incisions are. Please pat dry the incision. Apply a clean, dry dressing after drying off the area of your incisions. Don't take a tub bath, get in a swimming pool or Jacuzzi until the incisions are completely healed, and you are seen in the office by Dr. Eldon Aguilar. Do not soak your incisions under water. []   Do not get the dressing wet. Once you remove it two days from surgery, you may get the incision wet if there is no drainage. Weight Bearing Status/Braces/Activity:    [x]   If you have had a total knee replacement you may weight bear as tolerated with the knee immobilizer in place. Use crutches, cane, or walker as needed. You should sleep in your knee immobilizer.   You need to begin working on range of motion early after your surgery. It is very important to work on extension (straighthening) the knee. You will be advanced with walking and range of motion by physical therapy at home.     []   If you have had a total hip replacement you may weight bear as tolerated. Physical therapy with come by your house to help you perform exercises and work on strength and range of motion. Ice/Elevation:    Continue ice and elevation consistently for 48 hours after surgery. If you have had a total knee replacement when elevating your knee elevate it on about 4 pillows to be sure it is above your heart. After 48 hours, you should ice your knee 3 times per day, for 20 minutes at a time for the next 5 days. After one week from surgery, you may use ice and elevation as needed for pain and swelling. Diet:    You may advance to your regular diet as tolerated. Medication:    1. You will be given a prescription for pain medications when you are discharged from the hospital.  Take the medication as needed according to the directions on the prescription bottle. Possible side effects of the medication include dizziness, headache, nausea, vomiting, constipation and urinary retention. If you experience any of these side effects call the office so that we can assist you in relieving them. Discontinue the use of the pain medication if you develop itching, rash, shortness of breath or difficulties swallowing. If these symptoms become severe or aren't relieved by discontinuing the medication you should seek immediate medical attention. Refills of pain medication are authorized during office hours only (8AM - 5PM Mon thru Fri). 2. If you were prescribed Percocet/oxycodone or Dilaudid/hydromorphone you must have a written prescription. These medications legally cannot be called in to a pharmacy. 3. Do not take Tylenol in addition to your pain medication as most of the pain medication already contains Tylenol.   Exceptions include Dilaudid/hydromorphone, Demerol/meperidine and roxicodone. Do not exceed 3000 mg of Tylenol per day. Ex:  (hydrocodone 5/325mg = 325 mg of Tylenol)  4. You should use Aspirin 81 mg twice daily for 21 days from the date of your surgery. This will help to prevent blood clots from forming in your legs. This needs to be started the day after your surgery and home healthcare will teach you how this is done. If you are taking another medication such as Xarelto as discussed with Dr. Hung Benito this should also be started the day after the surgery. 5. You may resume the medications you were taking prior to your surgery, unless otherwise specified in your discharge instructions. Pain medication may change the effects of any antidepressant medication. If you have any questions about possible interactions between your regular medications and the pain medication you should consult the physician who prescribes your regular medications. 6. If you had a total joint as an outpatient procedure and did not spend the night in the hospital, Dr. Hung Benito has written for an oral antibiotic that you should take as instructed. This antibiotic is generally Keflex or Clindamycin. If you spent the night in the hospital the antibiotic was given to you through the IV and there is nothing else to take orally. Follow Up Appointment:    If you are unsure of your follow-up appointment date and time, please call [phone]. Please let our  know you are scheduling a post-op appointment. Most appointments should be between 7-14 days after your surgery. Physical Therapy:    [x]   Physical therapy will be discussed with you at your first follow-up appointment with Dr. Hung Benito. You don't need to begin physical therapy prior to that visit. You are to participate with [address] [place] [address] as arranged pre-operatively in the convience of your own home.     Important signs and symptoms:    If any of the following signs and symptoms occurs, you should contact Dr. Hung Benito' office. Please be advised if a problem arises which you feel required immediate medical attention or your are unable to contact Dr. Hung Benito' office you should seek immediate medical attention. Signs and symptoms to watch for include:  1. A sudden increase in swelling and/or redness or warmth at the area your surgery was performed which isn't relieved by rest, ice and elevation. 2. Oral temperature greater than 101.5 degrees for 12 hours or more which isn't relieved by an increase in fluid intake and taking two Tylenol every 4-6 hours. Do not exceed 3000 mg of Tylenol per day. 3. Excessive drainage from your incisions or drainage that hasn't stopped by 72 hours. 4. Calf pian, tenderness, redness or swelling which isn't relieved with rest and elevation. 5. Fever, chills, shortness of breath, chest pain, nausea, vomiting or other signs and symptoms which are of concern to you. DISCHARGE SUMMARY from Nurse    PATIENT INSTRUCTIONS:    After general anesthesia or intravenous sedation, for 24 hours or while taking prescription Narcotics:  · Limit your activities  · Do not drive and operate hazardous machinery  · Do not make important personal or business decisions  · Do  not drink alcoholic beverages  · If you have not urinated within 8 hours after discharge, please contact your surgeon on call.     Report the following to your surgeon:  · Excessive pain, swelling, redness or odor of or around the surgical area  · Temperature over 100.5  · Nausea and vomiting lasting longer than 4 hours or if unable to take medications  · Any signs of decreased circulation or nerve impairment to extremity: change in color, persistent  numbness, tingling, coldness or increase pain  · Any questions    What to do at Home:  Recommended activity: Ambulate in house and No lifting, Driving, or Strenuous exercise until advised by Dr. Hung Benito,     If you experience any of the following symptoms above, please follow up with Dr. Kalani Bernal. *  Please give a list of your current medications to your Primary Care Provider. *  Please update this list whenever your medications are discontinued, doses are      changed, or new medications (including over-the-counter products) are added. *  Please carry medication information at all times in case of emergency situations. These are general instructions for a healthy lifestyle:    No smoking/ No tobacco products/ Avoid exposure to second hand smoke  Surgeon General's Warning:  Quitting smoking now greatly reduces serious risk to your health. Obesity, smoking, and sedentary lifestyle greatly increases your risk for illness    A healthy diet, regular physical exercise & weight monitoring are important for maintaining a healthy lifestyle    You may be retaining fluid if you have a history of heart failure or if you experience any of the following symptoms:  Weight gain of 3 pounds or more overnight or 5 pounds in a week, increased swelling in our hands or feet or shortness of breath while lying flat in bed. Please call your doctor as soon as you notice any of these symptoms; do not wait until your next office visit. Recognize signs and symptoms of STROKE:    F-face looks uneven    A-arms unable to move or move unevenly    S-speech slurred or non-existent    T-time-call 911 as soon as signs and symptoms begin-DO NOT go       Back to bed or wait to see if you get better-TIME IS BRAIN. Warning Signs of HEART ATTACK     Call 911 if you have these symptoms:   Chest discomfort. Most heart attacks involve discomfort in the center of the chest that lasts more than a few minutes, or that goes away and comes back. It can feel like uncomfortable pressure, squeezing, fullness, or pain.  Discomfort in other areas of the upper body. Symptoms can include pain or discomfort in one or both arms, the back, neck, jaw, or stomach.    Shortness of breath with or without chest discomfort.  Other signs may include breaking out in a cold sweat, nausea, or lightheadedness. Don't wait more than five minutes to call 911 - MINUTES MATTER! Fast action can save your life. Calling 911 is almost always the fastest way to get lifesaving treatment. Emergency Medical Services staff can begin treatment when they arrive -- up to an hour sooner than if someone gets to the hospital by car. Patient armband removed and shredded    The discharge information has been reviewed with the patient and caregiver. The patient and caregiver verbalized understanding. Discharge medications reviewed with the patient and caregiver and appropriate educational materials and side effects teaching were provided.   ___________________________________________________________________________________________________________________________________

## 2018-11-28 NOTE — PERIOP NOTES
Patient preop HR 79 HR currently 91, elevates up to 105. Dr. Prashant Cortez made aware, no new orders.

## 2018-11-28 NOTE — PROGRESS NOTES
Problem: Mobility Impaired (Adult and Pediatric) Goal: *Acute Goals and Plan of Care (Insert Text) In 1-7 days pt will be able to perform: ST.  Bed mobility:  Rolling L to R to L modified independent for positioning. 2.  Supine to sit to supine S with HR for meals. 3.  Sit to stand to sit S with RW in prep for ambulation. LT.  Gait:  Ambulate >150ft S with RW, WBAT, for home/community mobility. 2.  Stair Negotiation:  Ascend/descend >3 steps CGA with HR for home entry. 3.  Activity tolerance: Tolerate up in chair 1-2 hours for ADLs. 4.  Patient/Family Education:  Patient/family to be independent with HEP for follow-up care and safe discharge. physical Therapy EVALUATION Patient: Gil Cleveland (67 y.o. female) Date: 2018 Primary Diagnosis: LEFT KNEE OSTEOARTHRITIS Left knee DJD Procedure(s) (LRB): LEFT TOTAL KNEE ARTHROPLASTY (Left) Day of Surgery Precautions:   Fall, WBAT 
 
ASSESSMENT : 
Based on the objective data described below, the patient presents with decreased functional mobility and independence in regard to bed mobility, transfers, gt quality and tolerance, L knee AROM, L knee strength, pain, stair negotiation and safety due to recent L TKA surgery. Pt rating pain in L knee 3/10 on numerical pain scale. Pt sitting in recliner upon arrival.  Pt required CGA for sit<>stand w/ vc for safe techniques. Pt able to participate in gt training w/ RW, WBAT, L KI, GB and CGA in hallway w/ antalgic pattern. Pt c/o nausea throughout session. Pt returned to sitting in recliner w/ all needs within reach. Nurse Armand Moeller aware and  present. Recommend [facility] d/c. Patient will benefit from skilled intervention to address the above impairments. Patients rehabilitation potential is considered to be Good Factors which may influence rehabilitation potential include:  
[]         None noted 
[]         Mental ability/status []         Medical condition []         Home/family situation and support systems 
[]         Safety awareness [x]         Pain tolerance/management 
[]         Other: PLAN : 
Recommendations and Planned Interventions: 
[x]           Bed Mobility Training             []    Neuromuscular Re-Education 
[x]           Transfer Training                   []    Orthotic/Prosthetic Training 
[x]           Gait Training                          []    Modalities [x]           Therapeutic Exercises          []    Edema Management/Control 
[x]           Therapeutic Activities            [x]    Patient and Family Training/Education 
[]           Other (comment): Frequency/Duration: Patient will be followed by physical therapy twice daily to address goals. Discharge Recommendations: Home Health Further Equipment Recommendations for Discharge: N/A  
 
SUBJECTIVE:  
Patient stated I am a little nauseated.  OBJECTIVE DATA SUMMARY:  
 
Past Medical History:  
Diagnosis Date  Arthritis   
 all over  Autoimmune disease ([facility] [address] Lichen Planus disease on gums  GERD (gastroesophageal reflux disease) 2016  
 well controlled  Hypertension 2016  Thyroid disease many years Past Surgical History:  
Procedure Laterality Date  HX  SECTION    
 x 2  
 HX HEENT    
 thyroid removed  HX OTHER SURGICAL  as a child  
 mole removed from ankle  HX OTHER SURGICAL    
 fatty tumor removed from back Barriers to Learning/Limitations: None Compensate with: visual, verbal, tactile, kinesthetic cues/model Prior Level of Function/Home Situation:  
Home Situation Home Environment: Private residence # Steps to Enter: 4 Rails to Enter: Yes Hand Rails : Bilateral 
One/Two Story Residence: Two story # of Interior Steps: 12 Interior Rails: Both Lift Chair Available: No 
Living Alone: No 
Support Systems: Spouse/Significant Other/Partner, Family member(s) Patient Expects to be Discharged to[de-identified] Private residence Current DME Used/Available at Home: Walker, rollingCritical Behavior: 
Neurologic State: Drowsy; Appropriate for age Orientation Level: Oriented X4 Cognition: Appropriate decision making; Appropriate for age attention/concentration; Appropriate safety awareness; Follows commands Safety/Judgement: Awareness of environment Psychosocial 
Patient Behaviors: Calm; CooperativeSkin Condition/Temp: Dry;Warm 
Skin Integrity: Incision (comment)(L knee) Skin Integumentary Skin Color: Appropriate for ethnicity Skin Condition/Temp: Dry;Warm 
Skin Integrity: Incision (comment)(L knee) Turgor: Non-tenting Strength:   
Strength: Generally decreased, functional 
Tone & Sensation:  
Tone: Normal 
Sensation: Intact Range Of Motion: 
AROM: Generally decreased, functional 
Functional Mobility: 
Bed Mobility: 
Scooting: Contact guard assistance(vc) 
Transfers: 
Sit to Stand: Contact guard assistance(vc) 
Stand to Sit: Contact guard assistance(vc) 
Balance:  
Sitting: Intact Standing: Intact; With supportAmbulation/Gait Training: 
Distance (ft): 60 Feet (ft) Assistive Device: Walker, rolling;Gait belt;Brace/Splint Ambulation - Level of Assistance: Contact guard assistance(vc) 
Gait Abnormalities: Antalgic;Decreased step clearance; Step to gait Left Side Weight Bearing: As tolerated Base of Support: Shift to right Stance: Left decreased Speed/[place]: Slow Step Length: Left shortened;Right shortened Swing Pattern: Left asymmetrical;Right asymmetrical 
Interventions: Safety awareness training; Tactile cues; Verbal cues; Visual/Demos Therapeutic Exercises: HEP written copy issued to pt per MD protocol. Reviewed HEP w/ pt and . Pain: 
Pain Scale 1: Numeric (0 - 10) Pain Intensity 1: 3 Pain Location 1: Knee Pain Orientation 1: Left Pain Description 1: Aching Pain Intervention(s) 1: Medication (see MAR); Emotional support;Cold pack Activity Tolerance:  
Fair Please refer to the flowsheet for vital signs taken during this treatment. After treatment:  
[x]         Patient left in no apparent distress sitting up in chair 
[]         Patient left in no apparent distress in bed 
[x]         Call bell left within reach [x]         Nursing notified 
[x]          present 
[]         Bed alarm activated COMMUNICATION/EDUCATION:  
[x]         Fall prevention education was provided and the patient/caregiver indicated understanding. [x]         Patient/family have participated as able in goal setting and plan of care. [x]         Patient/family agree to work toward stated goals and plan of care. []         Patient understands intent and goals of therapy, but is neutral about his/her participation. []         Patient is unable to participate in goal setting and plan of care. Thank you for this referral. 
Trini Lemus, PT Time Calculation: 23 mins Eval Complexity: History: HIGH Complexity :3+ comorbidities / personal factors will impact the outcome/ POC Exam:MEDIUM Complexity : 3 Standardized tests and measures addressing body structure, function, activity limitation and / or participation in recreation  Presentation: LOW Complexity : Stable, uncomplicated  Clinical Decision Making:Low Complexity amb >30' Overall Complexity:LOW  Mobility  Current  CI= 1-19%   Goal  CI= 1-19%. The severity rating is based on the Level of Assistance required for Functional Mobility and ADLs.

## 2018-11-28 NOTE — PERIOP NOTES
Dr. Jim Meza notified of HR 98 - 112 - no new orders - also notified of pts /o nausea - order to repeat Zofran 4 mg IV x 1

## 2018-11-28 NOTE — ANESTHESIA PREPROCEDURE EVALUATION
Anesthetic History No history of anesthetic complications Review of Systems / Medical History Patient summary reviewed, nursing notes reviewed and pertinent labs reviewed Pulmonary Within defined limits Neuro/Psych Within defined limits Cardiovascular Hypertension: well controlled Exercise tolerance: >4 METS 
  
GI/Hepatic/Renal 
  
GERD Endo/Other Hypothyroidism: poorly controlled Arthritis Other Findings Physical Exam 
 
Airway Mallampati: II 
TM Distance: 4 - 6 cm Neck ROM: normal range of motion Mouth opening: Normal 
 
 Cardiovascular Regular rate and rhythm,  S1 and S2 normal,  no murmur, click, rub, or gallop Dental 
No notable dental hx Pulmonary Breath sounds clear to auscultation Abdominal 
GI exam deferred Other Findings Anesthetic Plan ASA: 2 Anesthesia type: general and regional - femoral single shot Risk and benefits fully explained to the patient including bleeding, headache, nerve damage, infection, nausea, back pain, and hemodynamic changes. Patient understands and agrees to the procedure. Post-op pain plan if not by surgeon: peripheral nerve block single Induction: Intravenous Anesthetic plan and risks discussed with: Patient

## 2022-03-19 PROBLEM — E03.9 ACQUIRED HYPOTHYROIDISM: Status: ACTIVE | Noted: 2018-11-26

## 2022-03-19 PROBLEM — M17.12 PRIMARY OSTEOARTHRITIS OF LEFT KNEE: Status: ACTIVE | Noted: 2018-11-26

## 2022-03-19 PROBLEM — I10 HTN (HYPERTENSION): Status: ACTIVE | Noted: 2018-11-26

## 2022-03-19 PROBLEM — M17.12 LEFT KNEE DJD: Status: ACTIVE | Noted: 2018-11-28

## 2024-12-18 ENCOUNTER — HOSPITAL ENCOUNTER (OUTPATIENT)
Facility: HOSPITAL | Age: 64
Setting detail: SPECIMEN
Discharge: HOME | End: 2024-12-21

## 2024-12-18 ENCOUNTER — HOSPITAL ENCOUNTER (OUTPATIENT)
Facility: HOSPITAL | Age: 64
Discharge: HOME | End: 2024-12-20
Payer: COMMERCIAL

## 2024-12-18 DIAGNOSIS — M17.11 OSTEOARTHRITIS OF RIGHT KNEE, UNSPECIFIED OSTEOARTHRITIS TYPE: ICD-10-CM

## 2024-12-18 LAB
EKG ATRIAL RATE: 68 BPM
EKG DIAGNOSIS: NORMAL
EKG P AXIS: -3 DEGREES
EKG P-R INTERVAL: 144 MS
EKG Q-T INTERVAL: 418 MS
EKG QRS DURATION: 148 MS
EKG QTC CALCULATION (BAZETT): 444 MS
EKG R AXIS: 66 DEGREES
EKG T AXIS: 94 DEGREES
EKG VENTRICULAR RATE: 68 BPM
SENTARA SPECIMEN COLLECTION: NORMAL

## 2024-12-18 PROCEDURE — 99001 SPECIMEN HANDLING PT-LAB: CPT

## 2024-12-18 PROCEDURE — 93005 ELECTROCARDIOGRAM TRACING: CPT

## (undated) DEVICE — NEEDLE HYPO 22GA L1.5IN BLK S STL HUB POLYPR SHLD REG BVL

## (undated) DEVICE — BLADE SAW 1.19X20X90 MM FOR LG BNE

## (undated) DEVICE — SUTURE STRATAFIX SYMMETRIC PDS + 1 SGL ARMED CT 18 IN LEN SXPP1A405

## (undated) DEVICE — THE CANADY HYBRID PLASMA SCALPEL IS AN ELECTROSURGICAL PLASMA SCALPEL THAT USES AN 85MM BENDABLE PADDLE BLADE TIP. THE ELECTROSURGICAL PLASMA SCALPEL IS USED TO SIMULTANEOUSLY CUT AND COAGULATE BIOLOGICAL TISSUE.: Brand: CANADY HYBRID PLASMA PADDLE BLADE

## (undated) DEVICE — SOLUTION IV 250ML 0.9% SOD CHL CLR INJ FLX BG CONT PRT CLSR

## (undated) DEVICE — SYRINGE MED 20ML STD CLR PLAS LUERLOCK TIP N CTRL DISP

## (undated) DEVICE — STERILE TETRA-FLEX CF LF, 6IN X 11 YD: Brand: TETRA-FLEX™ CF

## (undated) DEVICE — TURNOVER KIT OR F/MIH 1450

## (undated) DEVICE — STERILE POLYISOPRENE POWDER-FREE SURGICAL GLOVES WITH EMOLLIENT COATING: Brand: PROTEXIS

## (undated) DEVICE — SINGLE PORT MANIFOLD: Brand: NEPTUNE 2

## (undated) DEVICE — INTENDED FOR TISSUE SEPARATION, AND OTHER PROCEDURES THAT REQUIRE A SHARP SURGICAL BLADE TO PUNCTURE OR CUT.: Brand: BARD-PARKER ® CARBON RIB-BACK BLADES

## (undated) DEVICE — LIGHT HANDLE: Brand: DEVON

## (undated) DEVICE — PACK PROCEDURE SURG TOT KNEE CUST

## (undated) DEVICE — REM POLYHESIVE ADULT PATIENT RETURN ELECTRODE: Brand: VALLEYLAB

## (undated) DEVICE — ADHESIVE SKIN CLOSURE 4X22 CM PREMIERPRO EXOFINFUSION DISP

## (undated) DEVICE — NDL PRT INJ NSAF BLNT 18GX1.5 --

## (undated) DEVICE — SOL INJ SOD CL 0.9% 500ML BG --

## (undated) DEVICE — HANDPIECE SET WITH FAN SPRAY TIP: Brand: INTERPULSE

## (undated) DEVICE — Device

## (undated) DEVICE — DRSG FOAM MEPILX PST OP 4X12IN --

## (undated) DEVICE — IMMOBILIZER KNEE UNIV L19IN FOR 12-24IN THGH FOAM T BAR

## (undated) DEVICE — KENDALL SCD EXPRESS SLEEVES, KNEE LENGTH, MEDIUM: Brand: KENDALL SCD

## (undated) DEVICE — GOWN,SIRUS,NONRNF,SETINSLV,XL,20/CS: Brand: MEDLINE

## (undated) DEVICE — SOL IRRIGATION INJ NACL 0.9% 500ML BTL

## (undated) DEVICE — SUT VCRL + 2-0 36IN CT1 UD --

## (undated) DEVICE — STERILE POLYISOPRENE POWDER-FREE SURGICAL GLOVES: Brand: PROTEXIS

## (undated) DEVICE — BLADE SAW W13XL90MM 1.19MM PARA

## (undated) DEVICE — NDL SPNE QNCKE 18GX3.5IN LF --

## (undated) DEVICE — PIN DRL QUIK HI PERF FOR SIG SYS

## (undated) DEVICE — DEVON™ KNEE AND BODY STRAP 60" X 3" (1.5 M X 7.6 CM): Brand: DEVON

## (undated) DEVICE — (D)PREP SKN CHLRAPRP APPL 26ML -- CONVERT TO ITEM 371833